# Patient Record
Sex: FEMALE | Race: WHITE | NOT HISPANIC OR LATINO | URBAN - METROPOLITAN AREA
[De-identification: names, ages, dates, MRNs, and addresses within clinical notes are randomized per-mention and may not be internally consistent; named-entity substitution may affect disease eponyms.]

---
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
## 2024-09-12 ENCOUNTER — TELEPHONE (OUTPATIENT)
Age: 24
End: 2024-09-12

## 2024-09-18 NOTE — TELEPHONE ENCOUNTER
2nd attempt to reach pt regarding online appt inquiry received. LVM and provided office phone number to assist with scheduling.